# Patient Record
Sex: FEMALE | Race: NATIVE HAWAIIAN OR OTHER PACIFIC ISLANDER | URBAN - METROPOLITAN AREA
[De-identification: names, ages, dates, MRNs, and addresses within clinical notes are randomized per-mention and may not be internally consistent; named-entity substitution may affect disease eponyms.]

---

## 2017-08-07 ENCOUNTER — THERAPY VISIT (OUTPATIENT)
Dept: PHYSICAL THERAPY | Facility: CLINIC | Age: 37
End: 2017-08-07
Payer: OTHER MISCELLANEOUS

## 2017-08-07 DIAGNOSIS — M54.50 ACUTE LEFT-SIDED LOW BACK PAIN WITHOUT SCIATICA: Primary | ICD-10-CM

## 2017-08-07 PROCEDURE — 97530 THERAPEUTIC ACTIVITIES: CPT | Mod: GP | Performed by: PHYSICAL THERAPIST

## 2017-08-07 PROCEDURE — 97161 PT EVAL LOW COMPLEX 20 MIN: CPT | Mod: GP | Performed by: PHYSICAL THERAPIST

## 2017-08-07 PROCEDURE — 97112 NEUROMUSCULAR REEDUCATION: CPT | Mod: GP | Performed by: PHYSICAL THERAPIST

## 2017-08-07 NOTE — LETTER
[facility]THLETIC [facility] PHYSICAL THERAPY   [address] [address]  [phone]    2017    Re: Laquita Monreal   :   1980  MRN:  [id number]   REFERRING PHYSICIAN:   Richard Delgado    [facility] PHYSICAL THERAPY    Date of Initial Evaluation:  2017  Visits:  Rxs Used: 1  Reason for Referral:  Acute left-sided low back pain without sciatica    EVALUATION SUMMARY    [facility] Initial Evaluation    Physical Therapy Initial Examination/Evaluation    Laquita is 36 year old female referred to physical therapy for treatment of lumbar sprain  Precautions/Restrictions/MD instructions none    Subjective:  Chief Complaint: L low back pain. Radiates to: no radiation  DOI/onset: 17 MD orders dated: 17.  Mechanism of injury:  felt sudden pain as she stood up after sitting. This was after transferring 2 patients, but she denies pain during the actual transfers.   DOS: na.  Imaging: na  Related PMH: none   Pain described as: a deep ache. Exacerbated by: lifting, bending, prolonged standing, sitting, or lying prone. Alleviated by: muscle relaxers.   Pain: at rest 0 /10, with activity 2-7/10. Pain frequency: intermittent  Time of day when pain is worse: end of the day.  Pain does interfere with sleep.  Progression of symptoms since initial onset: slowly improving.   Previous treatment for this complaint: none. Treatment benefit: na  Social history:  Lives with  and infant daughter.    Barriers within the home: stairs.  Occupation: CNA  Job duties:  Standing, walking, lifting, pushing, pulling, transferring patients, repetitive tasks. 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 No standing or walking >30'  (per patient report)  Current HEP/exercise regimen: none  Patient's goals are: to be able to return safely to her  normal job duties  General health as reported by patient: good  Medications: muscle relaxants.  Returns to MD:  After 6 therapy sessions       Re: Laquita Monreal   :   1980  Page:  2    Lumbar Spine/SIJ Evaluation:      Posture: excessive lumbar lordosis, FHP-kyphotic CTJ.    Lumbar AROM:   Motion ROM Pain   Flexion 30% loss -   Extension 0 % loss +   Side Bend L 25% loss +   Side Bend R 0+ loss +   Rotaion L 25% loss +   Rotation R 0% loss +     Repeated Motion: KIAN increased pain. EIL increased pain. Niether created any mechanical ROM change    Lumbar Myotomes   L R   T12-L3 (Hip Flexion)    L2-L4 (Knee Extension)    L4 (Ankle DF)    L5 (Great Toe Ext)    S1 (Ankle PF)      Lumbar DTR's:    L R   L4 (Quad) na na   S1 (Achilles) na na     Lumbar Dermatomes: Decreased sensation to light touch in na dermatome.    Neural Tension/Mobility:    L R   SLR - -   Slump - -     Palpation: tight ans tender over L paraspinal muscles  Segmental Mobility: hypermobile L3,4. Painful to p-a pressure L3-5  Flexibility Hamstrings decreased, Hip flexors (Maikol test) decreased, Piriformis normal.  SI Joint Provocation Tests:  negative    Re: Laquita Monreal   :   1980  Page   3          Muscle Activation   Good Transverse abdominus. Poor glute max  Trendelenberg Test: positive L  Gait: L trunk lean during L stance phase      Assessment/Plan:      Signs and symptoms are consistent with lumbar mechanical pain with a directional preference for flexion due to possible underlying instability. Given the mechanism of injury, it's possible that either discogenic problems, or instability is a key issue. The patent should do well with a program of neuro re-ed, and core strengthening, as well as review and practice of proper lifting mechanics.    Patient is a 36 year old female with lumbar complaints.    Patient has the following significant findings with corresponding treatment plan.                 Diagnosis 1:  L LBP  Pain -  hot/cold therapy, education and home program  Decreased ROM/flexibility - manual therapy, therapeutic exercise and home program  Decreased strength - therapeutic exercise, therapeutic activities and home program  Impaired gait - gait training  Impaired muscle performance - neuro re-education and home program  Decreased function - therapeutic activities and home program  Impaired posture - neuro re-education, therapeutic activities and home program  Instability -  Therapeutic Activity  Therapeutic Exercise  Neuromuscular Re-education  Splinting/Taping/Bracing/Orthotic  home program    Therapy Evaluation Codes:   1) History comprised of:   Personal factors that impact the plan of care:      Profession.    Comorbidity factors that impact the plan of care are:      None.     Medications impacting care: Muscle relaxant.  2) Examination of Body Systems comprised of:   Body structures and functions that impact the plan of care:      Lumbar spine.   Activity limitations that impact the plan of care are:      Bending, Lifting, Sitting, Standing, Walking and Sleeping.  3) Clinical presentation characteristics are:   Stable/Uncomplicated.  4) Decision-Making  Re: Laquita Monreal   :   1980  Page:  4        Low complexity using standardized patient assessment instrument and/or measureable assessment of functional outcome.  Cumulative Therapy Evaluation is: Low complexity.        Communication ability:  Patient appears to be able to clearly communicate and understand verbal and written communication and follow directions correctly.  Treatment Explanation - The following has been discussed with the patient:   RX ordered/plan of care  Anticipated outcomes  Possible risks and side effects  This patient would benefit from PT intervention to resume normal activities.   Rehab potential is good.    Frequency:  2 X week, once daily  Duration:  for 2 weeks tapering to 1 X a week over 2 weeks. Then  patient will be re-evaluated to see whether more therapy is needed.  Discharge Plan:  Achieve all LTG.  Independent in home treatment program.  Reach maximal therapeutic benefit.      Thank you for your referral.    INQUIRIES  Therapist: Maria Dolores Slater PT   [facility] PHYSICAL THERAPY  [address] [place] [address]  Phone: [phone]  Fax: [phone]

## 2017-08-07 NOTE — PROGRESS NOTES
[facility] Initial Evaluation      Subjective:    Physical Therapy Initial Examination/Evaluation    Laquita is 36 year old female referred to physical therapy for treatment of lumbar sprain  Precautions/Restrictions/MD instructions none    Subjective:  Chief Complaint: L low back pain. Radiates to: no radiation  DOI/onset: 7/18/17 MD orders dated: 7/25/17.  Mechanism of injury:  felt sudden pain as she stood up after sitting. This was after transferring 2 patients, but she denies pain during the actual transfers.   DOS: na.  Imaging: na  Related PMH: none   Pain described as: a deep ache. Exacerbated by: lifting, bending, prolonged standing, sitting, or lying prone. Alleviated by: muscle relaxers.   Pain: at rest 0 /10, with activity 2-7/10. Pain frequency: intermittent  Time of day when pain is worse: end of the day.  Pain does interfere with sleep.  Progression of symptoms since initial onset: slowly improving.   Previous treatment for this complaint: none. Treatment benefit: na  Social history:  Lives with  and infant daughter.    Barriers within the home: stairs.  Occupation: CNA  Job duties:  Standing, walking, lifting, pushing, pulling, transferring patients, repetitive tasks. 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 No standing or walking >30'  (per patient report)  Current HEP/exercise regimen: none  Patient's goals are: to be able to return safely to her normal job duties    General health as reported by patient: good  Medications: muscle relaxants.  Returns to MD:  After 6 therapy sessions         HPI                    Objective:  Lumbar Spine/SIJ Evaluation:      Posture: excessive lumbar lordosis, FHP-kyphotic CTJ.    Lumbar AROM:   Motion ROM Pain   Flexion 30% loss -   Extension 0 % loss +   Side Bend L 25% loss +   Side Bend R 0+ loss +   Rotaion L 25% loss +   Rotation R 0% loss +      Repeated Motion: KIAN increased pain. EIL increased pain. Niether created any mechanical ROM change    Lumbar Myotomes   L R   T12-L3 (Hip Flexion) 5/5 5/5   L2-L4 (Knee Extension) 5/5 5/5   L4 (Ankle DF) 5/5 5/5   L5 (Great Toe Ext) 5/5 5/5   S1 (Ankle PF) 5/5 5/5     Lumbar DTR's:    L R   L4 (Quad) na na   S1 (Achilles) na na     Lumbar Dermatomes: Decreased sensation to light touch in na dermatome.    Neural Tension/Mobility:    L R   SLR - -   Slump - -     Palpation: tight ans tender over L paraspinal muscles  Segmental Mobility: hypermobile L3,4. Painful to p-a pressure L3-5  Flexibility Hamstrings decreased, Hip flexors (Maikol test) decreased, Piriformis normal.    SI Joint Provocation Tests:  negative    Muscle Activation   Good Transverse abdominus. Poor glute max    Trendelenberg Test: positive L  Gait: L trunk lean during L stance phase          System    Physical Exam    General     ROS    Assessment/Plan:      Signs and symptoms are consistent with lumbar mechanical pain with a directional preference for flexion due to possible underlying instability. Given the mechanism of injury, it's possible that either discogenic problems, or instability is a key issue. The patent should do well with a program of neuro re-ed, and core strengthening, as well as review and practice of proper lifting mechanics.    Patient is a 36 year old female with lumbar complaints.    Patient has the following significant findings with corresponding treatment plan.                Diagnosis 1:  L LBP  Pain -  hot/cold therapy, education and home program  Decreased ROM/flexibility - manual therapy, therapeutic exercise and home program  Decreased strength - therapeutic exercise, therapeutic activities and home program  Impaired gait - gait training  Impaired muscle performance - neuro re-education and home program  Decreased function - therapeutic activities and home program  Impaired posture - neuro re-education, therapeutic  activities and home program  Instability -  Therapeutic Activity  Therapeutic Exercise  Neuromuscular Re-education  Splinting/Taping/Bracing/Orthotic  home program    Therapy Evaluation Codes:   1) History comprised of:   Personal factors that impact the plan of care:      Profession.    Comorbidity factors that impact the plan of care are:      None.     Medications impacting care: Muscle relaxant.  2) Examination of Body Systems comprised of:   Body structures and functions that impact the plan of care:      Lumbar spine.   Activity limitations that impact the plan of care are:      Bending, Lifting, Sitting, Standing, Walking and Sleeping.  3) Clinical presentation characteristics are:   Stable/Uncomplicated.  4) Decision-Making    Low complexity using standardized patient assessment instrument and/or measureable assessment of functional outcome.  Cumulative Therapy Evaluation is: Low complexity.    Previous and current functional limitations:  (See Goal Flow Sheet for this information)    Short term and Long term goals: (See Goal Flow Sheet for this information)     Communication ability:  Patient appears to be able to clearly communicate and understand verbal and written communication and follow directions correctly.  Treatment Explanation - The following has been discussed with the patient:   RX ordered/plan of care  Anticipated outcomes  Possible risks and side effects  This patient would benefit from PT intervention to resume normal activities.   Rehab potential is good.    Frequency:  2 X week, once daily  Duration:  for 2 weeks tapering to 1 X a week over 2 weeks. Then patient will be re-evaluated to see whether more therapy is needed.  Discharge Plan:  Achieve all LTG.  Independent in home treatment program.  Reach maximal therapeutic benefit.        Please refer to the daily flowsheet for treatment today, total treatment time and time spent performing 1:1 timed codes.

## 2017-08-07 NOTE — MR AVS SNAPSHOT
"              After Visit Summary   8/7/2017    Laquita Monreal    MRN: [id number]           Patient Information     Date Of Birth          1980        Visit Information        Provider Department      8/7/2017 1:40 PM Maria Dolores Slater, PT [facility] Physical [facility]        Today's Diagnoses     Acute left-sided low back pain without sciatica    -  1       Follow-ups after your visit        Your next 10 appointments already scheduled     Aug 10, 2017  2:20 PM CDT   [facility] Spine with Maria Dolores Slater PT   [facility] Physical Therapy ([facility]  )    [address] [place] [address] [phone]            Aug 15, 2017  2:00 PM CDT   [facility] Spine with Fabiola Parker PTA   [facility] ([address] [address] [phone]              Who to contact     If you have questions or need follow up information about today's clinic visit or your schedule please contact [facility] directly at [phone].  Normal or non-critical lab and imaging results will be communicated to you by KidoZenhart, letter or phone within 4 business days after the clinic has received the results. If you do not hear from us within 7 days, please contact the clinic through KidoZenhart or phone. If you have a critical or abnormal lab result, we will notify you by phone as soon as possible.  Submit refill requests through LawbitDocs or call your pharmacy and they will forward the refill request to us. Please allow 3 business days for your refill to be completed.          Additional Information About Your Visit        MyChart Information     LawbitDocs lets you send messages to your doctor, view your test results, renew your prescriptions, schedule appointments and more. To sign up, go to [email or web address] . Click on \"Log in\" on the left " "side of the screen, which will take you to the Welcome page. Then click on \"Sign up Now\" on the right side of the page.     You will be asked to enter the access code listed below, as well as some personal information. Please follow the directions to create your username and password.     Your access code is: [email or web address]  Expires: 2017  5:43 PM     Your access code will  in 90 days. If you need help or a new code, please call your [facility] clinic or [phone].        Care EveryWhere ID     This is your Care EveryWhere ID. This could be used by other organizations to access your [facility] medical records  [id number]         Blood Pressure from Last 3 Encounters:   No data found for BP    Weight from Last 3 Encounters:   No data found for Wt              We Performed the Following     HC PT EVAL, LOW COMPLEXITY     [facility] INITIAL EVAL REPORT     NEUROMUSCULAR RE-EDUCATION     THERAPEUTIC ACTIVITIES        Primary Care Provider    None Specified       No primary provider on file.        Equal Access to Services     SAMAN TEE : Hadii yuliet bashiro Sotanja, waaxda luqadaha, qaybta kaalmada adesweetieyaalessandro, eric mei . So [facility] [phone].    ATENCIÓN: Si kymberlyla español, tiene a springer disposición servicios gratuitos de asistencia lingüística. Llame al [phone].    We comply with applicable federal civil rights laws and [place] laws. We do not discriminate on the basis of race, color, national origin, age, disability sex, sexual orientation or gender identity.            Thank you!     Thank you for choosing [facility] PHYSICAL THERAPY  for your care. Our goal is always to provide you with excellent care. Hearing back from our patients is one way we can continue to improve our services. Please take a few minutes to complete the written survey that you may receive in the mail after your visit with us. Thank you!             Your Updated Medication " List - Protect others around you: Learn how to safely use, store and throw away your medicines at www.disposemymeds.org.      Notice  As of 8/7/2017  5:43 PM    You have not been prescribed any medications.

## 2017-08-08 NOTE — PROGRESS NOTES
Subjective:    Patient is a 36 year old female presenting with rehab back hpi.                                      Pertinent medical history includes:  None.      Current medications:  Muscle relaxants.  Current occupation is CNA.    Primary job tasks include:  Prolonged standing, lifting and repetitive tasks.                                Objective:    System    Physical Exam    General     ROS    Assessment/Plan:

## 2017-08-10 ENCOUNTER — THERAPY VISIT (OUTPATIENT)
Dept: PHYSICAL THERAPY | Facility: CLINIC | Age: 37
End: 2017-08-10
Payer: OTHER MISCELLANEOUS

## 2017-08-10 DIAGNOSIS — M54.50 ACUTE LEFT-SIDED LOW BACK PAIN WITHOUT SCIATICA: Primary | ICD-10-CM

## 2017-08-10 PROCEDURE — 97035 APP MDLTY 1+ULTRASOUND EA 15: CPT | Mod: GP | Performed by: PHYSICAL THERAPIST

## 2017-08-10 PROCEDURE — 97112 NEUROMUSCULAR REEDUCATION: CPT | Mod: GP | Performed by: PHYSICAL THERAPIST

## 2017-08-10 PROCEDURE — 97110 THERAPEUTIC EXERCISES: CPT | Mod: GP | Performed by: PHYSICAL THERAPIST

## 2017-08-16 ENCOUNTER — THERAPY VISIT (OUTPATIENT)
Dept: PHYSICAL THERAPY | Facility: CLINIC | Age: 37
End: 2017-08-16
Payer: OTHER MISCELLANEOUS

## 2017-08-16 DIAGNOSIS — M54.50 ACUTE LEFT-SIDED LOW BACK PAIN WITHOUT SCIATICA: ICD-10-CM

## 2017-08-16 PROCEDURE — 97035 APP MDLTY 1+ULTRASOUND EA 15: CPT | Mod: GP

## 2017-08-16 PROCEDURE — 97110 THERAPEUTIC EXERCISES: CPT | Mod: GP

## 2017-08-16 PROCEDURE — 97530 THERAPEUTIC ACTIVITIES: CPT | Mod: GP

## 2017-08-16 NOTE — MR AVS SNAPSHOT
After Visit Summary   8/16/2017    Laquita Monreal    MRN: [id number]           Patient Information     Date Of Birth          1980        Visit Information        Provider Department      8/16/2017 12:40 PM Fabiola Parker PTA [facility] Physical Therapy        Today's Diagnoses     Acute left-sided low back pain without sciatica           Follow-ups after your visit        Your next 10 appointments already scheduled     Aug 21, 2017  7:30 AM CDT   [facility] Spine with Fabiola Parker PTA   [facility] Physical Therapy ([facility]  )    [address] [place] [address]   [phone]            Aug 23, 2017 12:40 PM CDT   [facility] Spine with Fabiola Parker PTA   [facility] Physical Therapy ([facility]  )    [address] [place] [address]   [phone]            Aug 25, 2017  8:10 AM CDT   [facility] Spine with Fabiola Parker PTA   [facility] Physical Therapy ([facility]  )    [address] [place] [address]   [phone]              Who to contact     If you have questions or need follow up information about today's clinic visit or your schedule please contact [facility] directly at [phone].  Normal or non-critical lab and imaging results will be communicated to you by MyChart, letter or phone within 4 business days after the clinic has received the results. If you do not hear from us within 7 days, please contact the clinic through MyChart or phone. If you have a critical or abnormal lab result, we will notify you by phone as soon as possible.  Submit refill requests through Go Kin Packs or call your pharmacy and they will forward the refill request to us. Please allow 3 business days for your refill to be completed.          Additional Information About Your Visit        MyChart Information   "   Posto7 lets you send messages to your doctor, view your test results, renew your prescriptions, schedule appointments and more. To sign up, go to [email or web address] . Click on \"Log in\" on the left side of the screen, which will take you to the Welcome page. Then click on \"Sign up Now\" on the right side of the page.     You will be asked to enter the access code listed below, as well as some personal information. Please follow the directions to create your username and password.     Your access code is: [email or web address]  Expires: 2017  5:43 PM     Your access code will  in 90 days. If you need help or a new code, please call your [facility] clinic or [phone].        Care EveryWhere ID     This is your Care EveryWhere ID. This could be used by other organizations to access your [facility] medical records  [id number]         Blood Pressure from Last 3 Encounters:   No data found for BP    Weight from Last 3 Encounters:   No data found for Wt              We Performed the Following     Therapeutic Activities     Therapeutic Exercises     Ultrasound Therapy        Primary Care Provider    None Specified       No primary provider on file.        Equal Access to Services     [facility]: Hadjulieta Blanco, thad hester, mayelin aceves, eric mei . So [facility] [phone].    ATENCIÓN: Si habla español, tiene a springer disposición servicios gratuitos de asistencia lingüística. Llame al [phone].    We comply with applicable federal civil rights laws and [place] laws. We do not discriminate on the basis of race, color, national origin, age, disability sex, sexual orientation or gender identity.            Thank you!     Thank you for choosing [facility] PHYSICAL THERAPY  for your care. Our goal is always to provide you with excellent care. Hearing back from our patients is one way we can continue to improve our " services. Please take a few minutes to complete the written survey that you may receive in the mail after your visit with us. Thank you!             Your Updated Medication List - Protect others around you: Learn how to safely use, store and throw away your medicines at www.disposemymeds.org.      Notice  As of 8/16/2017  1:42 PM    You have not been prescribed any medications.

## 2017-08-16 NOTE — PROGRESS NOTES
Subjective:    HPI                    Objective:    System    Physical Exam    General     ROS    Assessment/Plan:      SUBJECTIVE  Subjective changes as noted by pt:   Past 2 days pain has been really bad, went to a class after work with sitting. Both sitting and standing were uncomfortable. Today is a little better.    Current pain level: 7/10 Current Pain level: 7/10   Changes in function:  Yes (See Goal flowsheet attached for changes in current functional level)     Adverse reaction to treatment or activity:  None    OBJECTIVE  Changes in objective findings:  Yes,   Objective: Needs cues for proper posterior pelvic tilt position. By end of session pt could do this in supine and standing. Yesika lifting 20# waist to floor. Min-mod tender L>R lower back      ASSESSMENT  Laquita continues to require intervention to meet STG and LTG's: PT  Patient is progressing as expected.  Response to therapy has shown an improvement in  pain level and muscle control  Progress made towards STG/LTG?  Yes (See Goal flowsheet attached for updates on achievement of STG and LTG)    PLAN  Current treatment program is being advanced to more complex exercises.    PTA/ATC plan:  Will continue with present plan of care.    Please refer to the daily flowsheet for treatment today, total treatment time and time spent performing 1:1 timed codes.

## 2017-08-21 ENCOUNTER — THERAPY VISIT (OUTPATIENT)
Dept: PHYSICAL THERAPY | Facility: CLINIC | Age: 37
End: 2017-08-21
Payer: OTHER MISCELLANEOUS

## 2017-08-21 DIAGNOSIS — M54.50 ACUTE LEFT-SIDED LOW BACK PAIN WITHOUT SCIATICA: ICD-10-CM

## 2017-08-21 PROCEDURE — 97530 THERAPEUTIC ACTIVITIES: CPT | Mod: GP

## 2017-08-21 PROCEDURE — 97112 NEUROMUSCULAR REEDUCATION: CPT | Mod: GP

## 2017-08-21 PROCEDURE — 97110 THERAPEUTIC EXERCISES: CPT | Mod: GP

## 2017-08-21 NOTE — MR AVS SNAPSHOT
"              After Visit Summary   8/21/2017    Laquita Monreal    MRN: [id number]           Patient Information     Date Of Birth          1980        Visit Information        Provider Department      8/21/2017 7:30 AM Fabiola Parker PTA [facility]        Today's Diagnoses     Acute left-sided low back pain without sciatica           Follow-ups after your visit        Your next 10 appointments already scheduled     Aug 23, 2017 12:40 PM CDT   ANGELES Spine with Fabiola Parker PTA   [facility] ([facility]  )    [address] [place] [id number] [phone]            Aug 25, 2017  8:10 AM CDT   ANGELES Spine with Fabiola Parker PTA   [facility] ([address] [address] [phone]              Who to contact     If you have questions or need follow up information about today's clinic visit or your schedule please contact [facility] PHYSICAL THERAPY directly at [phone].  Normal or non-critical lab and imaging results will be communicated to you by Crocodile Goldhart, letter or phone within 4 business days after the clinic has received the results. If you do not hear from us within 7 days, please contact the clinic through [employer] or phone. If you have a critical or abnormal lab result, we will notify you by phone as soon as possible.  Submit refill requests through [employer] or call your pharmacy and they will forward the refill request to us. Please allow 3 business days for your refill to be completed.          Additional Information About Your Visit        Crocodile Goldhart Information     [employer] lets you send messages to your doctor, view your test results, renew your prescriptions, schedule appointments and more. To sign up, go to [email or web address] . Click on \"Log in\" on the left side of " "the screen, which will take you to the Welcome page. Then click on \"Sign up Now\" on the right side of the page.     You will be asked to enter the access code listed below, as well as some personal information. Please follow the directions to create your username and password.     Your access code is: [email or web address]  Expires: 2017  5:43 PM     Your access code will  in 90 days. If you need help or a new code, please call your [facility] clinic or [phone].        Care EveryWhere ID     This is your Care EveryWhere ID. This could be used by other organizations to access your [facility] medical records  [id number]         Blood Pressure from Last 3 Encounters:   No data found for BP    Weight from Last 3 Encounters:   No data found for Wt              We Performed the Following     Neuromuscular Re-Education     Therapeutic Activities     Therapeutic Exercises        Primary Care Provider    None Specified       No primary provider on file.        Equal Access to Services     [facility]: Hadii yuliet Blanco, waaxda mir, qaybta kaalmada yola, eric mei . So [facility] [phone].    ATENCIÓN: Si habla español, tiene a springer disposición servicios gratuitos de asistencia lingüística. Llame al [phone].    We comply with applicable federal civil rights laws and [place] laws. We do not discriminate on the basis of race, color, national origin, age, disability sex, sexual orientation or gender identity.            Thank you!     Thank you for choosing [facility]ERAP[facility]  for your care. Our goal is always to provide you with excellent care. Hearing back from our patients is one way we can continue to improve our services. Please take a few minutes to complete the written survey that you may receive in the mail after your visit with us. Thank you!             Your Updated Medication List - Protect others around you: Learn " how to safely use, store and throw away your medicines at www.disposemymeds.org.      Notice  As of 8/21/2017  1:59 PM    You have not been prescribed any medications.

## 2017-08-23 ENCOUNTER — THERAPY VISIT (OUTPATIENT)
Dept: PHYSICAL THERAPY | Facility: CLINIC | Age: 37
End: 2017-08-23
Payer: OTHER MISCELLANEOUS

## 2017-08-23 DIAGNOSIS — M54.50 ACUTE LEFT-SIDED LOW BACK PAIN WITHOUT SCIATICA: ICD-10-CM

## 2017-08-23 PROCEDURE — 97530 THERAPEUTIC ACTIVITIES: CPT | Mod: GP

## 2017-08-23 PROCEDURE — 97112 NEUROMUSCULAR REEDUCATION: CPT | Mod: GP

## 2017-08-23 PROCEDURE — 97110 THERAPEUTIC EXERCISES: CPT | Mod: GP

## 2017-08-29 ENCOUNTER — THERAPY VISIT (OUTPATIENT)
Dept: PHYSICAL THERAPY | Facility: CLINIC | Age: 37
End: 2017-08-29
Payer: OTHER MISCELLANEOUS

## 2017-08-29 DIAGNOSIS — M54.50 ACUTE LEFT-SIDED LOW BACK PAIN WITHOUT SCIATICA: ICD-10-CM

## 2017-08-29 PROCEDURE — 97530 THERAPEUTIC ACTIVITIES: CPT | Mod: GP

## 2017-08-29 PROCEDURE — 97110 THERAPEUTIC EXERCISES: CPT | Mod: GP

## 2017-08-29 NOTE — MR AVS SNAPSHOT
"              After Visit Summary   2017    Laquita Monreal    MRN: [id number]           Patient Information     Date Of Birth          1980        Visit Information        Provider Department      2017 2:40 PM Fabiola Parker PTA [facility]        Today's Diagnoses     Acute left-sided low back pain without sciatica           Follow-ups after your visit        Who to contact     If you have questions or need follow up information about today's clinic visit or your schedule please contact [facility] PHYSICAL THERAPY directly at [phone].  Normal or non-critical lab and imaging results will be communicated to you by Listnerdhart, letter or phone within 4 business days after the clinic has received the results. If you do not hear from us within 7 days, please contact the clinic through [employer] or phone. If you have a critical or abnormal lab result, we will notify you by phone as soon as possible.  Submit refill requests through [employer] or call your pharmacy and they will forward the refill request to us. Please allow 3 business days for your refill to be completed.          Additional Information About Your Visit        MyChart Information     [employer] lets you send messages to your doctor, view your test results, renew your prescriptions, schedule appointments and more. To sign up, go to [email or web address] . Click on \"Log in\" on the left side of the screen, which will take you to the Welcome page. Then click on \"Sign up Now\" on the right side of the page.     You will be asked to enter the access code listed below, as well as some personal information. Please follow the directions to create your username and password.     Your access code is: [email or web address]  Expires: 2017  5:43 PM     Your access code will  in 90 days. If you need help or a new code, please call your [place] clinic or [phone].        Care " EveryWhere ID     This is your Care EveryWhere ID. This could be used by other organizations to access your [facility] medical records  [id number]         Blood Pressure from Last 3 Encounters:   No data found for BP    Weight from Last 3 Encounters:   No data found for Wt              We Performed the Following     ANGELES Progress Notes Report     Therapeutic Activities     Therapeutic Exercises        Primary Care Provider    None Specified       No primary provider on file.        Equal Access to Services     [facility]: Hadii aad ku hadasho Soomaali, waaxda luqadaha, qaybta kaalmada adeegyada, eric dubon hayaan adesweetie hongdanilovanesa mei . So [facility] [phone].    ATENCIÓN: Si habla español, tiene a springer disposición servicios gratuitos de asistencia lingüística. Llame al [phone].    We comply with applicable federal civil rights laws and [place] laws. We do not discriminate on the basis of race, color, national origin, age, disability sex, sexual orientation or gender identity.            Thank you!     Thank you for choosing [facility]  for your care. Our goal is always to provide you with excellent care. Hearing back from our patients is one way we can continue to improve our services. Please take a few minutes to complete the written survey that you may receive in the mail after your visit with us. Thank you!             Your Updated Medication List - Protect others around you: Learn how to safely use, store and throw away your medicines at www.disposemymeds.org.      Notice  As of 8/29/2017 11:59 PM    You have not been prescribed any medications.

## 2017-08-29 NOTE — LETTER
[facility] PHYSICAL THERAPY  [address] [place] [address]  [phone]          2017    Re: Laquita Monreal   :   1980  MRN:  [id number]   REFERRING PHYSICIAN:   Richard Delgado    [facility] PH[facility] THERAP[facility]    Date of Initial Evaluation:  2017  Visits:  Rxs Used: 6  Reason for Referral:  Acute left-sided low back pain without sciatica    EVALUATION SUMMARY    DISCHARGE REPORT  Progress reporting period is from 17 to 17.       SUBJECTIVE  Subjective changes noted by patient:  Still hasn't heard from Dr Carlson's office about scheduling. Back is feelling good. Pt feels ready to perform job duties as tolerated.     Current pain level is 0/10 Current Pain level: 0/10.  Previous pain level was  7/10 Initial Pain level: 7/10. Changes in function:  Yes (See Goal flowsheet attached for changes in current functional level) Adverse reaction to treatment or activity: None    OBJECTIVE  Changes noted in objective findings:  Yes,   Objective: Lumbar ROM WNL. Able to actively find lumbar neutral position, demonstrates proper lifting technique. Can handle 35# from waist to floor  up to 5 reps and carry 35# for 20 feet. Abdominal and glute strength 4/5. Good understanding of home program       ASSESSMENT/PLAN  Updated problem list and treatment plan: Diagnosis 1:  LBP  Decreased strength - therapeutic exercise, therapeutic activities and home program  STG/LTGs have been met or progress has been made towards goals:  Yes (See Goal flow sheet completed today.)  Assessment of Progress: The patient's condition is improving.  Self Management Plans:  Patient is independent in a home treatment program.  I have re-evaluated this patient and find that the nature, scope, duration and intensity of the therapy is appropriate for the medical condition of the patient.  Laquita continues to require the following intervention to  meet STG and LTG's:  PT intervention is no longer required to meet STG/LTG.          Re: Laquita Monreal   :   1980  Page:  2        Recommendations:  This patient is ready to be discharged from therapy and continue their home treatment program.  The progress note/discharge summary was written in collaboration with and reviewed by the physical therapist.    Thank you for your referral.    INQUIRIES  Therapist: Fabiola Parker PT  [facility]ER[facility]  [address] [place] [address]  Phone: [phone]  Fax: [phone]

## 2017-08-30 NOTE — PROGRESS NOTES
Subjective:    HPI                    Objective:    System    Physical Exam    General     ROS    Assessment/Plan:      DISCHARGE REPORT    Progress reporting period is from 8/7/17 to 8/29/17.       SUBJECTIVE  Subjective changes noted by patient:  Still hasn't heard from Dr Carlson's office about scheduling. Back is feelling good. Pt feels ready to perform job duties as tolerated.     Current pain level is 0/10 Current Pain level: 0/10.     Previous pain level was  7/10 Initial Pain level: 7/10.   Changes in function:  Yes (See Goal flowsheet attached for changes in current functional level)  Adverse reaction to treatment or activity: None    OBJECTIVE  Changes noted in objective findings:  Yes,   Objective: Lumbar ROM WNL. Able to actively find lumbar neutral position, demonstrates proper lifting technique. Can handle 35# from waist to floor  up to 5 reps and carry 35# for 20 feet. Abdominal and glute strength 4/5. Good understanding of home program       ASSESSMENT/PLAN  Updated problem list and treatment plan: Diagnosis 1:  LBP  Decreased strength - therapeutic exercise, therapeutic activities and home program  STG/LTGs have been met or progress has been made towards goals:  Yes (See Goal flow sheet completed today.)  Assessment of Progress: The patient's condition is improving.  Self Management Plans:  Patient is independent in a home treatment program.  I have re-evaluated this patient and find that the nature, scope, duration and intensity of the therapy is appropriate for the medical condition of the patient.  Laquita continues to require the following intervention to meet STG and LTG's:  PT intervention is no longer required to meet STG/LTG.    Recommendations:  This patient is ready to be discharged from therapy and continue their home treatment program.  The progress note/discharge summary was written in collaboration with and reviewed by the physical therapist.    Please refer to the daily flowsheet for  treatment today, total treatment time and time spent performing 1:1 timed codes.

## 2017-10-16 PROBLEM — M54.50 LEFT-SIDED LOW BACK PAIN WITHOUT SCIATICA: Status: RESOLVED | Noted: 2017-08-07 | Resolved: 2017-10-16
